# Patient Record
Sex: FEMALE | Race: WHITE | Employment: FULL TIME
[De-identification: names, ages, dates, MRNs, and addresses within clinical notes are randomized per-mention and may not be internally consistent; named-entity substitution may affect disease eponyms.]

---

## 2017-01-26 LAB
HPV INTERPRETATION - HISTORICAL: NORMAL
HPV INTERPRETER - HISTORICAL: NORMAL

## 2017-01-27 ENCOUNTER — RECORDS - HEALTHEAST (OUTPATIENT)
Dept: ADMINISTRATIVE | Facility: OTHER | Age: 37
End: 2017-01-27

## 2017-01-27 LAB

## 2017-09-03 ENCOUNTER — HEALTH MAINTENANCE LETTER (OUTPATIENT)
Age: 37
End: 2017-09-03

## 2018-02-06 ENCOUNTER — OFFICE VISIT - HEALTHEAST (OUTPATIENT)
Dept: FAMILY MEDICINE | Facility: CLINIC | Age: 38
End: 2018-02-06

## 2018-02-06 DIAGNOSIS — R05.9 COUGH: ICD-10-CM

## 2018-02-06 DIAGNOSIS — J02.9 SORE THROAT: ICD-10-CM

## 2018-02-06 LAB
DEPRECATED S PYO AG THROAT QL EIA: NORMAL
FLUAV AG SPEC QL IA: NORMAL
FLUBV AG SPEC QL IA: NORMAL

## 2018-02-07 LAB — GROUP A STREP BY PCR: NORMAL

## 2018-05-23 ENCOUNTER — OFFICE VISIT - HEALTHEAST (OUTPATIENT)
Dept: FAMILY MEDICINE | Facility: CLINIC | Age: 38
End: 2018-05-23

## 2018-05-23 DIAGNOSIS — J06.9 URI (UPPER RESPIRATORY INFECTION): ICD-10-CM

## 2018-05-31 ENCOUNTER — OFFICE VISIT - HEALTHEAST (OUTPATIENT)
Dept: FAMILY MEDICINE | Facility: CLINIC | Age: 38
End: 2018-05-31

## 2018-05-31 DIAGNOSIS — M54.9 BACK PAIN: ICD-10-CM

## 2018-11-02 ENCOUNTER — COMMUNICATION - HEALTHEAST (OUTPATIENT)
Dept: ADMINISTRATIVE | Facility: CLINIC | Age: 38
End: 2018-11-02

## 2018-11-21 ENCOUNTER — OFFICE VISIT - HEALTHEAST (OUTPATIENT)
Dept: FAMILY MEDICINE | Facility: CLINIC | Age: 38
End: 2018-11-21

## 2018-11-21 DIAGNOSIS — N64.4 BREAST PAIN: ICD-10-CM

## 2018-11-26 ENCOUNTER — HOSPITAL ENCOUNTER (OUTPATIENT)
Dept: MAMMOGRAPHY | Facility: CLINIC | Age: 38
Discharge: HOME | End: 2018-11-26
Attending: FAMILY MEDICINE

## 2018-11-26 ENCOUNTER — HOSPITAL ENCOUNTER (OUTPATIENT)
Dept: ULTRASOUND IMAGING | Facility: CLINIC | Age: 38
Discharge: HOME | End: 2018-11-26
Attending: FAMILY MEDICINE

## 2018-11-26 DIAGNOSIS — N64.4 BREAST PAIN: ICD-10-CM

## 2019-11-07 ENCOUNTER — HEALTH MAINTENANCE LETTER (OUTPATIENT)
Age: 39
End: 2019-11-07

## 2020-02-23 ENCOUNTER — HEALTH MAINTENANCE LETTER (OUTPATIENT)
Age: 40
End: 2020-02-23

## 2020-11-29 ENCOUNTER — HEALTH MAINTENANCE LETTER (OUTPATIENT)
Age: 40
End: 2020-11-29

## 2021-05-31 VITALS — BODY MASS INDEX: 24.34 KG/M2 | WEIGHT: 154 LBS

## 2021-06-01 VITALS — WEIGHT: 156.38 LBS | BODY MASS INDEX: 24.71 KG/M2

## 2021-06-01 VITALS — WEIGHT: 157.19 LBS | BODY MASS INDEX: 24.84 KG/M2

## 2021-06-02 VITALS — BODY MASS INDEX: 25 KG/M2 | WEIGHT: 158.19 LBS

## 2021-06-15 NOTE — PROGRESS NOTES
Assessment/Plan:     Patient presents with several familial sick contacts and symptoms concerning for influenza or strep pharyngitis.  Patient's physical exam is essentially benign, although she does have the very soft and mild rhonchi in the right lower lobe that was appreciated intermittently on auscultation.  She does endorse shortness of breath, but this resolves with rest.  Her oxygen saturation was 100% and respiratory effort today was excellent.  Both rapid strep and influenza swab were negative.  However, patient's daughter did test positive for influenza during the same office visit.  Discussed the options with mom, she would prefer to be treated essentially empirically as well.  Oseltamivir 75 mg twice daily for 5 days prescribed.    Patient was sent home with symptomatic care instructions as well as return precautions.    Problem List Items Addressed This Visit     None      Visit Diagnoses     Sore throat    -  Primary    Relevant Orders    Rapid Strep A Screen-Throat    Cough        Relevant Orders    Influenza A/B Rapid Test          Return to clinic PRN.    Total time spent with patient was 10 minutes with greater than 50% spent in face-to-face counseling regarding the above plan.    Subjective:      Lety Garcia is a 37 y.o. female who presents to clinic for cough and fever.    Patient is accompanied by her daughter today with similar symptoms.    Patient has been experiencing symptoms for 2 days. It is worsening.  Patient endorses: rhinorrhea, congestion, post-nasal drip, sore throat, fatigue, and cough productive of sputum in the AM, SOB with exertion and exposure to cold, ear pressure  Patient denies: chest pain, fever, ear pain, ear discharge, rash, fatigue, muscle aches  Treatment thus far has consisted of lozenges.  Sick contacts include both daughters. Daughter who is not present today was ill first on Saturday night (diagnosed with ear infection but negative for strep) and the currently  present daughter became ill last night.  Recent travel to [facility] and [facility].    Past Medical History, Family History, and Social History reviewed.     Current Outpatient Prescriptions on File Prior to Visit   Medication Sig Dispense Refill     multivitamin therapeutic (THERAGRAN) tablet Take 1 tablet by mouth daily.       cholecalciferol, vitamin D3, 5,000 unit Tab Take by mouth.       GILDESS FE 1/20, 28, 1 mg-20 mcg (21)/75 mg (7) per tablet        No current facility-administered medications on file prior to visit.        Review of systems is as stated in HPI.  The remainder of the 10 system review is otherwise negative.    Objective:     /78 (Patient Site: Right Arm, Patient Position: Sitting, Cuff Size: Adult Regular)  Pulse 84  Temp 98.5  F (36.9  C) (Oral)   Wt 154 lb (69.9 kg)  LMP 01/16/2018  SpO2 100%  BMI 24.34 kg/m2 Body mass index is 24.34 kg/(m^2).    Gen: Alert, NAD, appears stated age, normal hygiene   Eyes: conjunctivae without injection, sclera clear, EOMI  ENT/mouth: nares clear, septum midline, absent rhinorrhea, mild pharyngeal injection, neck is supple, no thyroid enlargement; erythematous nares bilaterally  CV: RRR, no murmur appreciated  Resp: CTAB, no wheezes, rales or ronchi  ABD: non-tender to palpation, nondistended  MSK: grossly full range of motion in all joints, no obvious deformity  Neuro: CN II-XII grossly intact, no deficits in coordination  Psych: no apparent hallucinations or delusions, no pressured speech; alert, oriented x3  SKIN: dry and without lesions  Heme/lymph: no pallor, no active bleeding/bruising, no adenopathy appreciated    This note has been dictated using voice recognition software. Any grammatical or context distortions are unintentional and inherent to the the software.     Ellie Hartmann MD  [facility]

## 2021-06-18 NOTE — PROGRESS NOTES
ASSESSMENT/PLAN:    URI (upper respiratory infection)  Pleasant 37 y.o.  female presented with 2 weeks of cough and facial pain and pressure..  Examination was unremarkable today.  The patient may continue with OTC symptomatic treatment.  Rest, hydration, nutritional support, and time were counseled.  Follow up if the patient is not better in 1-2 weeks.  Offer cough medicine but she declined.  The patient verbalized understanding and agreed with the plan.    SUBJECTIVE:    Lety Garcia is a 37 y.o. female who came in today 2 weeks of sore throat, stuffy nose, nasal discharge, facial pain pressure, semi-productive cough.  She has been feeling very tired lately with poor concentration.  She has been medicating with Mucinex without relief.  She denied fever, chills, myalgia, shortness of breath, wheezing, gastrointestinal symptoms, headaches, abdominal pain.  No sick contact.  No history of cigarette smoking.  There is a family history of allergies but the patient has no known allergies.  She is otherwise very healthy and takes no medications.    Review of Systems (except those mentioned above)  Constitutional: Negative.   HENT: Negative.   Eyes: Negative.   Respiratory: Negative.   Cardiovascular: Negative.   Gastrointestinal: Negative.   Endocrine: Negative.   Genitourinary: Negative.   Musculoskeletal: Negative.   Skin: Negative.   Allergic/Immunologic: Negative.   Neurological: Negative.   Hematological: Negative.   Psychiatric/Behavioral: Negative.     There are no active problems to display for this patient.    No Known Allergies  Current Outpatient Prescriptions   Medication Sig Dispense Refill     cholecalciferol, vitamin D3, 5,000 unit Tab Take by mouth.       multivitamin therapeutic (THERAGRAN) tablet Take 1 tablet by mouth daily.       GILDESS FE 1/20, 28, 1 mg-20 mcg (21)/75 mg (7) per tablet        No current facility-administered medications for this visit.      No past medical history on file.  Past  Surgical History:   Procedure Laterality Date      SECTION, LOW TRANSVERSE       Social History     Social History     Marital status:      Spouse name: Basil     Number of children: 2     Years of education: N/A     Occupational History     [employer]ural [employer]      Social History Main Topics     Smoking status: Never Smoker     Smokeless tobacco: Never Used     Alcohol use Yes      Comment: 4/week     Drug use: No     Sexual activity: Yes     Partners: Male     Birth control/ protection: OCP     Other Topics Concern     Not on file     Social History Narrative     Family History   Problem Relation Age of Onset     Dementia Maternal Grandfather          OBJECTIVE:    Vitals:    18 1529   BP: 104/72   Pulse: 76   Temp: 98.2  F (36.8  C)   TempSrc: Oral   SpO2: 97%   Weight: 157 lb 3 oz (71.3 kg)     Body mass index is 24.84 kg/(m^2).    Physical Exam:  Constitutional: Patient was oriented to person, place, and time. Patient appeared well-developed and well-nourished. No distress.   Head: Normocephalic and atraumatic.   Right Ear: External ear normal. Normal TM  Left Ear: External ear normal. Normal TM  Nose: Nose normal.   Mouth/Throat: Oropharynx was clear and moist. No oropharyngeal exudate.   Eyes: Conjunctivae and EOM were normal. Pupils were equal, round, and reactive to light. Right eye exhibited no discharge. Left eye exhibited no discharge. No scleral icterus.   Lymphadenopathy:  Patient has no cervical adenopathy.   Cardiovascular: Normal rate, regular rhythm, normal heart sounds and intact distal pulses. No murmur heard.   Pulmonary/Chest: Effort normal and breath sounds normal. No stridor. No respiratory distress. Patient had no wheezes, no rales, exhibits no tenderness.   Skin: Skin was warm and dry. No rash noted. Patient was not diaphoretic. No erythema. No pallor.

## 2021-06-18 NOTE — PROGRESS NOTES
ASSESSMENT/PLAN:    Back pain  37-year-old female with chronic low back pain presented today for worsening low back pain.  The pain is located at the left lower back without any radiation or radiculopathy.  I recommended physical therapy and supportive cares.  Imaging is not indicated at this time.  She will think about physical therapy but follow-up if she is not better in 4-8 weeks or sooner if she has worsening symptoms.  May continue with over-the-counter analgesics as needed.  we will talked about warning symptoms.  The patient verbalized understanding and agreed with the plan  -     Ambulatory referral to Physical Therapy      Orders Placed This Encounter   Procedures     Ambulatory referral to Physical Therapy     Referral Priority:   Routine     Referral Type:   Physical Therapy     Referral Reason:   Evaluation and Treatment     Requested Specialty:   Physical Therapy     Number of Visits Requested:   1       CHIEF COMPLAINT:  Chief Complaint   Patient presents with     Back Pain     x many years fall playing ice hockey, pain is always there, but now pain is more intense       HISTORY OF PRESENT ILLNESS:  Lety is a 37 y.o. female presenting to the clinic today for back pain. She has been having pain in the left lower back. The pain is intermittent, but having frequently. She feels the muscles are tight for her in the back. She has had lower back pain for years. She did play hockey years ago, and would have occasional pain with that. She did have a fall during a hockey game in college, and feels this could have been the trigger for the history of low back pain. She has also done cross fit in the past, and this type of exercising made the pain worse for her. She continues to be active and play sports, and has discomfort in the back while doing these activity. The pain does not move any where. No radiating pain down the legs or up the arms. The pain does not prevent her from doing her daily activities,  but she notices the pain during. She rates the pain at a 3-4/10, and at a 6/10 with use. The pain is worse with prolonged sitting or standing. No weakness or numbness in the legs. Denies bladder or bowel dysfunction. She has been doing some stretching for the back. She does see a chiropractor occasionally. She has not been taking any medication for the back pain.       REVIEW OF SYSTEMS:   Cough and cold symptoms are resolved, and she is feeling much better. All other systems are negative.    PFSH:  No new history.     TOBACCO USE:  History   Smoking Status     Never Smoker   Smokeless Tobacco     Never Used       VITALS:  Vitals:    05/31/18 0858   BP: 118/62   Pulse: 80   Weight: 156 lb 6 oz (70.9 kg)     Wt Readings from Last 3 Encounters:   05/31/18 156 lb 6 oz (70.9 kg)   05/23/18 157 lb 3 oz (71.3 kg)   02/06/18 154 lb (69.9 kg)       PHYSICAL EXAM:  Constitutional: Patient is oriented to person, place, and time. Patient appears well-developed and well-nourished. No distress.   Skin: Skin is warm and dry. No rash noted. Patient is not diaphoretic. No erythema. No pallor.  Musculoskeletal: Rises from seated position without difficulties. Walks without gait imbalance. No bruising, deformity or swelling of low back.  No tenderness to palpation to the spinous or perispinous processes. No SI joint tenderness.  She has full flexion extension of her back.  Full flexion extension of her hip with good internal rotation and external rotation.  Of note, external rotation and internal rotation of the left hip elicits some stretching and discomfort at the left lower back      ADDITIONAL HISTORY SUMMARIZED (2): None.  DECISION TO OBTAIN EXTRA INFORMATION (1): None.   RADIOLOGY TESTS (1): None.  LABS (1): None.  MEDICINE TESTS (1): None.  INDEPENDENT REVIEW (2 each): None.     The visit lasted a total of 13 minutes face to face with the patient. Over 50% of the time was spent counseling and educating the patient about her  back pain.    I, Celia Nieto, am scribing for and in the presence of, Dr. Charlton.    I, Orlin Wynn MD , personally performed the services described in this documentation, as scribed by Celia Nieto in my presence, and it is both accurate and complete.    MEDICATIONS:  Current Outpatient Prescriptions   Medication Sig Dispense Refill     cholecalciferol, vitamin D3, 5,000 unit Tab Take by mouth.       multivitamin therapeutic (THERAGRAN) tablet Take 1 tablet by mouth daily.       No current facility-administered medications for this visit.        Total data points: 0

## 2021-06-21 NOTE — PROGRESS NOTES
ASSESSMENT/PLAN:    Breast pain, right  38-year-old female presents with intermittent right breast pain.  Examination was unremarkable.  Suspect benign however will order a diagnostic mammogram.  I will communicate the results to the patient.  She verbalized understanding and agree with the plan  -     Mammo Diagnostic Right; Future    SUBJECTIVE:    Lety Garcia is a 38 y.o. female who came in today for intermittent on and off discomfort of her right breast for about 2 months.  There are some days when she does not feel it.  The discomfort is worsened during her menstrual cycle.  She endorses soreness to the touch especially in the lateral aspect of her right breast.  She has not had any trauma.  She denies any mass.  She denies any nipple discharge.  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  There is no family history of breast problems.    Review of Systems (except those mentioned above)  Constitutional: Negative.   HENT: Negative.   Eyes: Negative.   Respiratory: Negative.   Cardiovascular: Negative.   Gastrointestinal: Negative.   Endocrine: Negative.   Genitourinary: Negative.   Musculoskeletal: Negative.   Skin: Negative.   Allergic/Immunologic: Negative.   Neurological: Negative.   Hematological: Negative.   Psychiatric/Behavioral: Negative.     There are no active problems to display for this patient.    No Known Allergies  Current Outpatient Medications   Medication Sig Dispense Refill     cholecalciferol, vitamin D3, 5,000 unit Tab Take by mouth.       multivitamin therapeutic (THERAGRAN) tablet Take 1 tablet by mouth daily.       No current facility-administered medications for this visit.      No past medical history on file.  Past Surgical History:   Procedure Laterality Date      SECTION, LOW TRANSVERSE  2011     Social History     Socioeconomic History     Marital status:      Spouse name: [facility]     Number of children: 2     Years of education: None      Highest education level: None   Social Needs     Financial resource strain: None     Food insecurity - worry: None     Food insecurity - inability: None     Transportation needs - medical: None     Transportation needs - non-medical: None   Occupational History     Occupation: [employer]   Tobacco Use     Smoking status: Never Smoker     Smokeless tobacco: Never Used   Substance and Sexual Activity     Alcohol use: Yes     Comment: 4/week     Drug use: No     Sexual activity: Yes     Partners: Male     Birth control/protection: OCP   Other Topics Concern     None   Social History Narrative     None     Family History   Problem Relation Age of Onset     Dementia Maternal Grandfather          OBJECTIVE:    Vitals:    11/21/18 0857   BP: 104/60   Pulse: 68   SpO2: 99%   Weight: 158 lb 3 oz (71.8 kg)     Body mass index is 25 kg/m .    Physical Exam:  Constitutional: Patient is oriented to person, place, and time. Patient appears well-developed and well-nourished. No distress.   Head: Normocephalic and atraumatic.   Right Ear: External ear normal.   Left Ear: External ear normal.   Nose: Nose normal.   Eyes: Conjunctivae and EOM are normal. Right eye exhibits no discharge. Left eye exhibits no discharge. No scleral icterus.   Neurological: Patient is alert and oriented to person, place, and time. Patient has normal reflexes. No cranial nerve deficit. Coordination normal.   Skin: No rash noted. Patient is not diaphoretic. No erythema. No pallor.  Right breast: Normal-appearing without any rash or skin involvement.  No palpable mass.  No axillary lymph node involvement.  No nipple discharge

## 2021-09-25 ENCOUNTER — HEALTH MAINTENANCE LETTER (OUTPATIENT)
Age: 41
End: 2021-09-25

## 2022-01-15 ENCOUNTER — HEALTH MAINTENANCE LETTER (OUTPATIENT)
Age: 42
End: 2022-01-15

## 2022-12-26 ENCOUNTER — HEALTH MAINTENANCE LETTER (OUTPATIENT)
Age: 42
End: 2022-12-26

## 2023-04-22 ENCOUNTER — HEALTH MAINTENANCE LETTER (OUTPATIENT)
Age: 43
End: 2023-04-22

## 2024-02-04 ENCOUNTER — HEALTH MAINTENANCE LETTER (OUTPATIENT)
Age: 44
End: 2024-02-04